# Patient Record
Sex: MALE | Race: WHITE
[De-identification: names, ages, dates, MRNs, and addresses within clinical notes are randomized per-mention and may not be internally consistent; named-entity substitution may affect disease eponyms.]

---

## 2018-01-23 ENCOUNTER — HOSPITAL ENCOUNTER (OUTPATIENT)
Dept: HOSPITAL 89 - RAD | Age: 58
End: 2018-01-23
Attending: INTERNAL MEDICINE
Payer: COMMERCIAL

## 2018-01-23 DIAGNOSIS — M25.561: Primary | ICD-10-CM

## 2018-01-23 DIAGNOSIS — M47.897: ICD-10-CM

## 2018-01-23 DIAGNOSIS — M47.896: ICD-10-CM

## 2018-01-23 PROCEDURE — 72100 X-RAY EXAM L-S SPINE 2/3 VWS: CPT

## 2018-01-23 NOTE — RADIOLOGY IMAGING REPORT
FACILITY: [facility] 

 

PATIENT NAME: Brynn Quintanilla

: 1960

MR: [id number]

V: [id number]

EXAM DATE: 

ORDERING PHYSICIAN: ASTER STATON

TECHNOLOGIST: 

 

Location: [facility] [place]

Patient: Brynn Quintanilla

: 1960

MRN: [id number]

Visit/Account:[id number]

Date of Sevice:  2018

 

ACCESSION #: [id number]

 

LUMBAR SPINE 2 OR 3 VIEW

 

HISTORY:  Lower back and knee pain.  Chronic

 

COMPARISON:  None.

 

FINDINGS:

Two views of the lumbar spine are submitted.

Osseous alignment is anatomic.  Mild disc narrowing is noted at the level of L5-S1.  Endplate spurrin
g is seen involving the L1-L2 level.  Mild facet arthrosis in the lower lumbar/lumbosacral spine.  At
herosclerotic arterial calcifications are noted.

 

IMPRESSION:

 

1.  Mild spondylotic changes at L1-L2 and L5-S1.

 

Report Dictated By: Scott Walls MD at 2018 11:39 AM

 

Report E-Signed By: Scott Walls MD  at 2018 11:40 AM

 

WSN:[id number]

## 2018-01-23 NOTE — RADIOLOGY IMAGING REPORT
[place] [facility] [place] 

 

PATIENT NAME: Brynn Quintanilla

: 1960

MR: [id number]

V: [id number]

EXAM DATE: 

ORDERING PHYSICIAN: ASTER STATON

TECHNOLOGIST: 

 

Location: [facility]

Patient: Brynn Quintanilla

: 1960

MRN: [id number]

Visit/Account:[id number]

Date of Sevice:  2018

 

ACCESSION #: [id number]

 

Right knee

 

Indication: contralateral knee x-ray and MR from 2014.

 

Comparison: None available

 

Findings:

3 views right knee were obtained.

Osseous alignment is anatomic.  No fracture.  No cortical disruption.  No significant joint fluid or 
underlying degenerative change.

Serpiginous type calcifications are noted in the proximal shaft of the right tibia.  These are simila
r as compared to the findings from the contralateral knee from 2014.  Follow-up MRI at that helio
e demonstrated findings indicative of marrow infarcts.

 

IMPRESSION:

1.  No evidence of acute osseous finding involving the right knee.

2.  Similar to the contralateral side, mildly serpiginous sclerotic foci involving the medullary cavi
ty of the proximal tibial shaft.  Findings from the MRI examination from 2014 demonstrated bony
 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  The findings in this right tibial shaft are likely from the same etiology.

 

Report Dictated By: Scott Walls MD at 2018 11:44 AM

 

Report E-Signed By: Scott Walls MD  at 2018 11:53 AM

 

WSN:[id number]